# Patient Record
Sex: FEMALE | Race: WHITE | NOT HISPANIC OR LATINO
[De-identification: names, ages, dates, MRNs, and addresses within clinical notes are randomized per-mention and may not be internally consistent; named-entity substitution may affect disease eponyms.]

---

## 2017-09-20 ENCOUNTER — APPOINTMENT (OUTPATIENT)
Dept: INTERNAL MEDICINE | Facility: CLINIC | Age: 42
End: 2017-09-20
Payer: COMMERCIAL

## 2017-09-20 VITALS
SYSTOLIC BLOOD PRESSURE: 130 MMHG | HEIGHT: 63 IN | RESPIRATION RATE: 16 BRPM | TEMPERATURE: 98.6 F | WEIGHT: 210 LBS | BODY MASS INDEX: 37.21 KG/M2 | DIASTOLIC BLOOD PRESSURE: 100 MMHG | HEART RATE: 76 BPM

## 2017-09-20 DIAGNOSIS — K59.00 CONSTIPATION, UNSPECIFIED: ICD-10-CM

## 2017-09-20 DIAGNOSIS — Z82.49 FAMILY HISTORY OF ISCHEMIC HEART DISEASE AND OTHER DISEASES OF THE CIRCULATORY SYSTEM: ICD-10-CM

## 2017-09-20 DIAGNOSIS — K64.9 UNSPECIFIED HEMORRHOIDS: ICD-10-CM

## 2017-09-20 DIAGNOSIS — Z00.00 ENCOUNTER FOR GENERAL ADULT MEDICAL EXAMINATION W/OUT ABNORMAL FINDINGS: ICD-10-CM

## 2017-09-20 DIAGNOSIS — Z80.1 FAMILY HISTORY OF MALIGNANT NEOPLASM OF TRACHEA, BRONCHUS AND LUNG: ICD-10-CM

## 2017-09-20 DIAGNOSIS — M79.1 MYALGIA: ICD-10-CM

## 2017-09-20 DIAGNOSIS — K21.9 GASTRO-ESOPHAGEAL REFLUX DISEASE W/OUT ESOPHAGITIS: ICD-10-CM

## 2017-09-20 PROCEDURE — 99204 OFFICE O/P NEW MOD 45 MIN: CPT

## 2017-10-03 ENCOUNTER — APPOINTMENT (OUTPATIENT)
Dept: INTERNAL MEDICINE | Facility: CLINIC | Age: 42
End: 2017-10-03
Payer: COMMERCIAL

## 2017-10-03 VITALS
HEART RATE: 84 BPM | RESPIRATION RATE: 16 BRPM | DIASTOLIC BLOOD PRESSURE: 84 MMHG | SYSTOLIC BLOOD PRESSURE: 124 MMHG | TEMPERATURE: 98.4 F

## 2017-10-03 PROCEDURE — 99213 OFFICE O/P EST LOW 20 MIN: CPT | Mod: 25

## 2017-10-03 PROCEDURE — 36415 COLL VENOUS BLD VENIPUNCTURE: CPT

## 2017-10-04 LAB
CHOLEST SERPL-MCNC: 243 MG/DL
CHOLEST/HDLC SERPL: 3.8 RATIO
HDLC SERPL-MCNC: 64 MG/DL
LDLC SERPL CALC-MCNC: 139 MG/DL
TRIGL SERPL-MCNC: 202 MG/DL

## 2017-12-29 ENCOUNTER — RX RENEWAL (OUTPATIENT)
Age: 42
End: 2017-12-29

## 2018-04-06 ENCOUNTER — RX RENEWAL (OUTPATIENT)
Age: 43
End: 2018-04-06

## 2018-04-06 ENCOUNTER — MEDICATION RENEWAL (OUTPATIENT)
Age: 43
End: 2018-04-06

## 2018-06-06 ENCOUNTER — APPOINTMENT (OUTPATIENT)
Dept: INTERNAL MEDICINE | Facility: CLINIC | Age: 43
End: 2018-06-06
Payer: COMMERCIAL

## 2018-06-06 VITALS
TEMPERATURE: 98.4 F | HEART RATE: 76 BPM | HEIGHT: 63 IN | RESPIRATION RATE: 16 BRPM | DIASTOLIC BLOOD PRESSURE: 88 MMHG | OXYGEN SATURATION: 99 % | BODY MASS INDEX: 37.21 KG/M2 | WEIGHT: 210 LBS | SYSTOLIC BLOOD PRESSURE: 140 MMHG

## 2018-06-06 DIAGNOSIS — I10 ESSENTIAL (PRIMARY) HYPERTENSION: ICD-10-CM

## 2018-06-06 DIAGNOSIS — E03.9 HYPOTHYROIDISM, UNSPECIFIED: ICD-10-CM

## 2018-06-06 DIAGNOSIS — L70.8 OTHER ACNE: ICD-10-CM

## 2018-06-06 DIAGNOSIS — Z78.9 OTHER SPECIFIED HEALTH STATUS: ICD-10-CM

## 2018-06-06 DIAGNOSIS — E78.5 HYPERLIPIDEMIA, UNSPECIFIED: ICD-10-CM

## 2018-06-06 PROCEDURE — 99215 OFFICE O/P EST HI 40 MIN: CPT | Mod: 25

## 2018-06-06 PROCEDURE — 36415 COLL VENOUS BLD VENIPUNCTURE: CPT

## 2018-06-06 NOTE — HISTORY OF PRESENT ILLNESS
[FreeTextEntry1] : F/U HTN, Obesity, Hyperlipidemia, Hypothyroidism  [de-identified] : 42 yr old presents above conditions. Feels well overall. Upset about her obesity and recent weight gain. Reports her schedule will be lessening over summer months and plans on Life style changes happening. Seems motivated to change at this time. Reports BP taken at home and at work ranges Systolic 125- 135, Diastolic 75- 82. 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

## 2018-06-06 NOTE — COUNSELING
[Weight management counseling provided] : Weight management [Healthy eating counseling provided] : healthy eating [Activity counseling provided] : activity [Behavioral health counseling provided] : behavioral health  [Target Wt Loss Goal ___] : Target weight loss goal [unfilled] lbs [Weight Self Once Weekly] : Weight self once weekly [Decrease Portions] : Decrease food portions [Low Salt Diet] : Low salt diet [Walking] : Walking [Sleep ___ hours/day] : Sleep [unfilled] hours/day [Engage in a relaxing activity] : Engage in a relaxing activity [Plan in advance] : Plan in advance [None] : None [Good understanding] : Patient has a good understanding of lifestyle changes and the steps needed to achieve self management goals [de-identified] : Discussed in detail weight loss programs; Pt given number to Nutritionist/

## 2018-06-06 NOTE — REVIEW OF SYSTEMS
[Depression] : depression [Negative] : Heme/Lymph [FreeTextEntry2] : O [de-identified] : mild depression, + verge of tears discussing weight problem

## 2018-06-06 NOTE — ASSESSMENT
[FreeTextEntry1] : 42 yr old presents F/U \par #1 HTN- needs slightly better control, Whit coat Syndrome?? Weight loss, low salt, exercise, continue monitoring BP/ Logging\par #2 Hyperlipidemia- Weight loss, medication, exercise\par #3 Hypothyroidism- Check TFT's today\par #4 BMI 37%- Encouraged weight loss program , Life style changes, stress reduction\par #4 Acne- Clean and dry Topical medication , Avoid direct Sun contact\par Labs today, Form for Job done F/U 3 months BP Log/ Weight Re-check

## 2018-06-06 NOTE — PHYSICAL EXAM
[No Acute Distress] : no acute distress [Well Nourished] : well nourished [Well Developed] : well developed [Well-Appearing] : well-appearing [Normal Sclera/Conjunctiva] : normal sclera/conjunctiva [PERRL] : pupils equal round and reactive to light [EOMI] : extraocular movements intact [Normal Outer Ear/Nose] : the outer ears and nose were normal in appearance [Normal Oropharynx] : the oropharynx was normal [No JVD] : no jugular venous distention [Supple] : supple [No Lymphadenopathy] : no lymphadenopathy [Thyroid Normal, No Nodules] : the thyroid was normal and there were no nodules present [No Respiratory Distress] : no respiratory distress  [Clear to Auscultation] : lungs were clear to auscultation bilaterally [No Accessory Muscle Use] : no accessory muscle use [Normal Rate] : normal rate  [Regular Rhythm] : with a regular rhythm [Normal S1, S2] : normal S1 and S2 [No Murmur] : no murmur heard [No Edema] : there was no peripheral edema [Normal Posterior Cervical Nodes] : no posterior cervical lymphadenopathy [Normal Anterior Cervical Nodes] : no anterior cervical lymphadenopathy [No CVA Tenderness] : no CVA  tenderness [No Spinal Tenderness] : no spinal tenderness [No Joint Swelling] : no joint swelling [Grossly Normal Strength/Tone] : grossly normal strength/tone [No Rash] : no rash [Normal Gait] : normal gait [Coordination Grossly Intact] : coordination grossly intact [No Focal Deficits] : no focal deficits [Deep Tendon Reflexes (DTR)] : deep tendon reflexes were 2+ and symmetric [Speech Grossly Normal] : speech grossly normal [Normal Affect] : the affect was normal [Normal Insight/Judgement] : insight and judgment were intact [de-identified] : +Obese [de-identified] : mildly depressed

## 2018-06-06 NOTE — HEALTH RISK ASSESSMENT
[No falls in past year] : Patient reported no falls in the past year [0] : 2) Feeling down, depressed, or hopeless: Not at all (0) [] : No [de-identified] : none [de-identified] : GYN/ Colposcopy- normal  [PDM2Tfidl] : 0

## 2018-06-07 LAB
BASOPHILS # BLD AUTO: 0.02 K/UL
BASOPHILS NFR BLD AUTO: 0.4 %
EOSINOPHIL # BLD AUTO: 0.14 K/UL
EOSINOPHIL NFR BLD AUTO: 2.7 %
HCT VFR BLD CALC: 46.3 %
HGB BLD-MCNC: 14.6 G/DL
IMM GRANULOCYTES NFR BLD AUTO: 0.2 %
LYMPHOCYTES # BLD AUTO: 1.51 K/UL
LYMPHOCYTES NFR BLD AUTO: 29.3 %
MAN DIFF?: NORMAL
MCHC RBC-ENTMCNC: 31.5 GM/DL
MCHC RBC-ENTMCNC: 31.6 PG
MCV RBC AUTO: 100.2 FL
MONOCYTES # BLD AUTO: 0.35 K/UL
MONOCYTES NFR BLD AUTO: 6.8 %
NEUTROPHILS # BLD AUTO: 3.13 K/UL
NEUTROPHILS NFR BLD AUTO: 60.6 %
PLATELET # BLD AUTO: 307 K/UL
RBC # BLD: 4.62 M/UL
RBC # FLD: 13.7 %
WBC # FLD AUTO: 5.16 K/UL

## 2018-06-11 ENCOUNTER — TRANSCRIPTION ENCOUNTER (OUTPATIENT)
Age: 43
End: 2018-06-11

## 2018-06-11 LAB
ALBUMIN SERPL ELPH-MCNC: 4.7 G/DL
ALP BLD-CCNC: 59 U/L
ALT SERPL-CCNC: 29 U/L
ANION GAP SERPL CALC-SCNC: 12 MMOL/L
AST SERPL-CCNC: 25 U/L
BILIRUB SERPL-MCNC: 0.8 MG/DL
BUN SERPL-MCNC: 18 MG/DL
CALCIUM SERPL-MCNC: 9.6 MG/DL
CHLORIDE SERPL-SCNC: 104 MMOL/L
CHOLEST SERPL-MCNC: 228 MG/DL
CHOLEST/HDLC SERPL: 3.2 RATIO
CO2 SERPL-SCNC: 28 MMOL/L
CREAT SERPL-MCNC: 0.86 MG/DL
GLUCOSE SERPL-MCNC: 85 MG/DL
HBA1C MFR BLD HPLC: 5 %
HDLC SERPL-MCNC: 72 MG/DL
LDLC SERPL CALC-MCNC: 135 MG/DL
POTASSIUM SERPL-SCNC: 4.7 MMOL/L
PROT SERPL-MCNC: 7.4 G/DL
SODIUM SERPL-SCNC: 144 MMOL/L
TRIGL SERPL-MCNC: 107 MG/DL
TSH SERPL-ACNC: 3.36 UIU/ML

## 2018-06-20 ENCOUNTER — RX RENEWAL (OUTPATIENT)
Age: 43
End: 2018-06-20

## 2018-09-14 ENCOUNTER — RX RENEWAL (OUTPATIENT)
Age: 43
End: 2018-09-14

## 2018-09-14 RX ORDER — LEVOTHYROXINE SODIUM 0.05 MG/1
50 TABLET ORAL
Qty: 90 | Refills: 1 | Status: ACTIVE | COMMUNITY
Start: 2017-09-20 | End: 1900-01-01

## 2019-02-27 ENCOUNTER — OUTPATIENT (OUTPATIENT)
Dept: OUTPATIENT SERVICES | Facility: HOSPITAL | Age: 44
LOS: 1 days | End: 2019-02-27
Payer: COMMERCIAL

## 2019-02-27 ENCOUNTER — APPOINTMENT (OUTPATIENT)
Dept: MAMMOGRAPHY | Facility: CLINIC | Age: 44
End: 2019-02-27
Payer: COMMERCIAL

## 2019-02-27 DIAGNOSIS — Z12.31 ENCOUNTER FOR SCREENING MAMMOGRAM FOR MALIGNANT NEOPLASM OF BREAST: ICD-10-CM

## 2019-02-27 PROCEDURE — 77063 BREAST TOMOSYNTHESIS BI: CPT | Mod: 26

## 2019-02-27 PROCEDURE — 77067 SCR MAMMO BI INCL CAD: CPT | Mod: 26

## 2019-02-27 PROCEDURE — 77067 SCR MAMMO BI INCL CAD: CPT

## 2019-02-27 PROCEDURE — 77063 BREAST TOMOSYNTHESIS BI: CPT

## 2020-04-25 ENCOUNTER — MESSAGE (OUTPATIENT)
Age: 45
End: 2020-04-25

## 2020-06-11 ENCOUNTER — APPOINTMENT (OUTPATIENT)
Dept: MAMMOGRAPHY | Facility: CLINIC | Age: 45
End: 2020-06-11

## 2020-08-17 ENCOUNTER — APPOINTMENT (OUTPATIENT)
Dept: ULTRASOUND IMAGING | Facility: CLINIC | Age: 45
End: 2020-08-17
Payer: COMMERCIAL

## 2020-08-17 ENCOUNTER — APPOINTMENT (OUTPATIENT)
Dept: MAMMOGRAPHY | Facility: CLINIC | Age: 45
End: 2020-08-17
Payer: COMMERCIAL

## 2020-08-17 ENCOUNTER — OUTPATIENT (OUTPATIENT)
Dept: OUTPATIENT SERVICES | Facility: HOSPITAL | Age: 45
LOS: 1 days | End: 2020-08-17
Payer: COMMERCIAL

## 2020-08-17 DIAGNOSIS — Z12.31 ENCOUNTER FOR SCREENING MAMMOGRAM FOR MALIGNANT NEOPLASM OF BREAST: ICD-10-CM

## 2020-08-17 DIAGNOSIS — Z00.8 ENCOUNTER FOR OTHER GENERAL EXAMINATION: ICD-10-CM

## 2020-08-17 PROCEDURE — 77067 SCR MAMMO BI INCL CAD: CPT | Mod: 26

## 2020-08-17 PROCEDURE — 77063 BREAST TOMOSYNTHESIS BI: CPT | Mod: 26

## 2020-08-17 PROCEDURE — 76641 ULTRASOUND BREAST COMPLETE: CPT

## 2020-08-17 PROCEDURE — 77063 BREAST TOMOSYNTHESIS BI: CPT

## 2020-08-17 PROCEDURE — 76641 ULTRASOUND BREAST COMPLETE: CPT | Mod: 26,50

## 2020-08-17 PROCEDURE — 77067 SCR MAMMO BI INCL CAD: CPT

## 2021-06-07 ENCOUNTER — APPOINTMENT (OUTPATIENT)
Dept: UROLOGY | Facility: CLINIC | Age: 46
End: 2021-06-07
Payer: COMMERCIAL

## 2021-06-07 ENCOUNTER — TRANSCRIPTION ENCOUNTER (OUTPATIENT)
Age: 46
End: 2021-06-07

## 2021-06-07 VITALS
DIASTOLIC BLOOD PRESSURE: 87 MMHG | TEMPERATURE: 97.7 F | WEIGHT: 210 LBS | RESPIRATION RATE: 16 BRPM | HEIGHT: 63 IN | BODY MASS INDEX: 37.21 KG/M2 | SYSTOLIC BLOOD PRESSURE: 142 MMHG | HEART RATE: 63 BPM

## 2021-06-07 DIAGNOSIS — Z87.59 PERSONAL HISTORY OF OTHER COMPLICATIONS OF PREGNANCY, CHILDBIRTH AND THE PUERPERIUM: ICD-10-CM

## 2021-06-07 DIAGNOSIS — N39.3 STRESS INCONTINENCE (FEMALE) (MALE): ICD-10-CM

## 2021-06-07 DIAGNOSIS — Z86.39 PERSONAL HISTORY OF OTHER ENDOCRINE, NUTRITIONAL AND METABOLIC DISEASE: ICD-10-CM

## 2021-06-07 DIAGNOSIS — Z86.79 PERSONAL HISTORY OF OTHER DISEASES OF THE CIRCULATORY SYSTEM: ICD-10-CM

## 2021-06-07 PROCEDURE — 99204 OFFICE O/P NEW MOD 45 MIN: CPT

## 2021-06-07 RX ORDER — LEVOTHYROXINE SODIUM 137 UG/1
TABLET ORAL
Refills: 0 | Status: DISCONTINUED | COMMUNITY
End: 2021-06-07

## 2021-06-07 RX ORDER — SIMVASTATIN 5 MG/1
5 TABLET, FILM COATED ORAL
Qty: 90 | Refills: 0 | Status: DISCONTINUED | COMMUNITY
Start: 2017-09-20 | End: 2021-06-07

## 2021-06-07 RX ORDER — SIMVASTATIN 5 MG/1
5 TABLET, FILM COATED ORAL DAILY
Refills: 0 | Status: DISCONTINUED | COMMUNITY
End: 2021-06-07

## 2021-06-07 RX ORDER — SIMVASTATIN 10 MG/1
10 TABLET, FILM COATED ORAL
Refills: 0 | Status: ACTIVE | COMMUNITY

## 2021-06-07 RX ORDER — RAMIPRIL 2.5 MG/1
2.5 CAPSULE ORAL DAILY
Refills: 0 | Status: ACTIVE | COMMUNITY

## 2021-06-07 RX ORDER — CLINDAMYCIN AND BENZOYL PEROXIDE 50; 10 MG/G; MG/G
1-5 GEL TOPICAL DAILY
Qty: 1 | Refills: 3 | Status: DISCONTINUED | COMMUNITY
Start: 2018-06-06 | End: 2021-06-07

## 2021-06-07 RX ORDER — LEVOTHYROXINE SODIUM 0.05 MG/1
50 TABLET ORAL DAILY
Qty: 90 | Refills: 0 | Status: DISCONTINUED | COMMUNITY
End: 2021-06-07

## 2021-06-07 RX ORDER — RAMIPRIL 2.5 MG/1
2.5 CAPSULE ORAL DAILY
Qty: 90 | Refills: 1 | Status: DISCONTINUED | COMMUNITY
Start: 2017-09-20 | End: 2021-06-07

## 2021-06-07 NOTE — HISTORY OF PRESENT ILLNESS
[FreeTextEntry1] : Hoda Duncan presents to the office for initial visit for microscopic hematuria. She is a 45 year old female with history of hypertension, hyperlipidemia, and hypothyroidism who was told she has microscopic hematuria on urinalysis at her annual physical 1 year ago with Dr. Richard Campos. She denies gross hematuria, history of stones. States she smoked socially in college, but no tobacco or occupational exposures since. 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 She states she was told she had blood in her urine many year ago but attributed it to her menstrual cycle. She currently uses a Mirena IUD and rarely gets a period. Denies family history of  cancers. \par \par Of note, she states she experienced a UTI last month with urgency, with incontinence, frequency, and burning. No urine culture performed, treated with Bactrim DS which cleared it up. She states she believes it was related to sex and not urinating. Has not had a UTI in a very long time. \par \par The patient also complains of stress urinary incontinence with sneezing, coughing, exercising. Has to brace herself with sneeze to prevent large leakage. Worsening in last 3-4 years. Two vaginal deliveries. First delivery traumatic with bad episiotomy 17 years ago. Has not tried anything to improve the incontinence. States she needs to lose weight and perform Kegels. Discussed physical therapy. \par \par Allergies: none \par \par

## 2021-06-07 NOTE — PHYSICAL EXAM
[General Appearance - Well Developed] : well developed [General Appearance - Well Nourished] : well nourished [General Appearance - In No Acute Distress] : no acute distress [Edema] : no peripheral edema [Exaggerated Use Of Accessory Muscles For Inspiration] : no accessory muscle use [Abdomen Soft] : soft [Abdomen Tenderness] : non-tender [Costovertebral Angle Tenderness] : no ~M costovertebral angle tenderness [Normal Station and Gait] : the gait and station were normal for the patient's age [Skin Color & Pigmentation] : normal skin color and pigmentation [No Focal Deficits] : no focal deficits [Oriented To Time, Place, And Person] : oriented to person, place, and time [FreeTextEntry1] : deferred until time of cysto

## 2021-06-07 NOTE — ASSESSMENT
[FreeTextEntry1] : Microscopic hematuria. Needs eval\par --UA, UCx\par --Renal US\par --Cysto\par \par GLO. Stress urinary incontinence. Discussed non-surgical and surgical management. Non-surgical management involves strengthening of the pelvic floor with kegel exercises +/- pelvic floor PT. Surgical management options include urethral bulking (not good for high volume leakage and not permanent) and mid urethral sling. For this patient, leakage is low volume and pt is not interested in surgical management. \par --Exam at time of cysto\par --consider PT

## 2021-06-07 NOTE — REVIEW OF SYSTEMS
[Date of last menstrual period ____] : date of last menstrual period: [unfilled] [Told you have blood in urine on a urine test] : told blood was present in a urine test [Wake up at night to urinate  How many times?  ___] : wakes up to urinate [unfilled] times during the night [Leakage of urine with straining, coughing, laughing] : leakage of urine with straining, coughing, laughing [see HPI] : see HPI [Negative] : Psychiatric

## 2021-06-08 ENCOUNTER — TRANSCRIPTION ENCOUNTER (OUTPATIENT)
Age: 46
End: 2021-06-08

## 2021-06-08 LAB
APPEARANCE: CLEAR
BACTERIA: NEGATIVE
BILIRUBIN URINE: NEGATIVE
BLOOD URINE: ABNORMAL
COLOR: NORMAL
GLUCOSE QUALITATIVE U: NEGATIVE
HYALINE CASTS: 0 /LPF
KETONES URINE: NEGATIVE
LEUKOCYTE ESTERASE URINE: NEGATIVE
MICROSCOPIC-UA: NORMAL
NITRITE URINE: NEGATIVE
PH URINE: 6
PROTEIN URINE: NEGATIVE
RED BLOOD CELLS URINE: 4 /HPF
SPECIFIC GRAVITY URINE: 1.01
SQUAMOUS EPITHELIAL CELLS: 1 /HPF
UROBILINOGEN URINE: NORMAL
WHITE BLOOD CELLS URINE: 1 /HPF

## 2021-06-10 LAB — BACTERIA UR CULT: NORMAL

## 2021-08-01 ENCOUNTER — NON-APPOINTMENT (OUTPATIENT)
Age: 46
End: 2021-08-01

## 2021-08-02 ENCOUNTER — APPOINTMENT (OUTPATIENT)
Dept: UROLOGY | Facility: CLINIC | Age: 46
End: 2021-08-02
Payer: COMMERCIAL

## 2021-08-02 ENCOUNTER — OUTPATIENT (OUTPATIENT)
Dept: OUTPATIENT SERVICES | Facility: HOSPITAL | Age: 46
LOS: 1 days | End: 2021-08-02
Payer: COMMERCIAL

## 2021-08-02 VITALS
HEART RATE: 61 BPM | SYSTOLIC BLOOD PRESSURE: 164 MMHG | DIASTOLIC BLOOD PRESSURE: 95 MMHG | TEMPERATURE: 98.5 F | RESPIRATION RATE: 16 BRPM

## 2021-08-02 DIAGNOSIS — R31.29 OTHER MICROSCOPIC HEMATURIA: ICD-10-CM

## 2021-08-02 DIAGNOSIS — R35.0 FREQUENCY OF MICTURITION: ICD-10-CM

## 2021-08-02 PROCEDURE — 52000 CYSTOURETHROSCOPY: CPT

## 2021-08-02 PROCEDURE — ZZZZZ: CPT

## 2021-08-05 ENCOUNTER — APPOINTMENT (OUTPATIENT)
Dept: ULTRASOUND IMAGING | Facility: CLINIC | Age: 46
End: 2021-08-05

## 2021-08-18 ENCOUNTER — APPOINTMENT (OUTPATIENT)
Dept: ULTRASOUND IMAGING | Facility: CLINIC | Age: 46
End: 2021-08-18
Payer: COMMERCIAL

## 2021-08-18 ENCOUNTER — OUTPATIENT (OUTPATIENT)
Dept: OUTPATIENT SERVICES | Facility: HOSPITAL | Age: 46
LOS: 1 days | End: 2021-08-18
Payer: COMMERCIAL

## 2021-08-18 ENCOUNTER — APPOINTMENT (OUTPATIENT)
Dept: MAMMOGRAPHY | Facility: CLINIC | Age: 46
End: 2021-08-18
Payer: COMMERCIAL

## 2021-08-18 DIAGNOSIS — Z00.8 ENCOUNTER FOR OTHER GENERAL EXAMINATION: ICD-10-CM

## 2021-08-18 PROCEDURE — 77067 SCR MAMMO BI INCL CAD: CPT | Mod: 26

## 2021-08-18 PROCEDURE — 77063 BREAST TOMOSYNTHESIS BI: CPT | Mod: 26

## 2021-08-18 PROCEDURE — 77067 SCR MAMMO BI INCL CAD: CPT

## 2021-08-18 PROCEDURE — 76641 ULTRASOUND BREAST COMPLETE: CPT | Mod: 26,50

## 2021-08-18 PROCEDURE — 77063 BREAST TOMOSYNTHESIS BI: CPT

## 2021-08-18 PROCEDURE — 76641 ULTRASOUND BREAST COMPLETE: CPT

## 2024-09-10 ENCOUNTER — APPOINTMENT (OUTPATIENT)
Dept: SURGERY | Facility: CLINIC | Age: 49
End: 2024-09-10
Payer: COMMERCIAL

## 2024-09-10 VITALS
RESPIRATION RATE: 16 BRPM | BODY MASS INDEX: 37.12 KG/M2 | WEIGHT: 209.5 LBS | HEIGHT: 63 IN | SYSTOLIC BLOOD PRESSURE: 129 MMHG | OXYGEN SATURATION: 99 % | DIASTOLIC BLOOD PRESSURE: 87 MMHG | HEART RATE: 59 BPM | TEMPERATURE: 96.9 F

## 2024-09-10 DIAGNOSIS — E66.9 OBESITY, UNSPECIFIED: ICD-10-CM

## 2024-09-10 DIAGNOSIS — Z87.19 PERSONAL HISTORY OF OTHER DISEASES OF THE DIGESTIVE SYSTEM: ICD-10-CM

## 2024-09-10 DIAGNOSIS — M79.673 PAIN IN UNSPECIFIED FOOT: ICD-10-CM

## 2024-09-10 DIAGNOSIS — Z82.0 FAMILY HISTORY OF EPILEPSY AND OTHER DISEASES OF THE NERVOUS SYSTEM: ICD-10-CM

## 2024-09-10 DIAGNOSIS — M25.569 PAIN IN UNSPECIFIED KNEE: ICD-10-CM

## 2024-09-10 DIAGNOSIS — Z82.49 FAMILY HISTORY OF ISCHEMIC HEART DISEASE AND OTHER DISEASES OF THE CIRCULATORY SYSTEM: ICD-10-CM

## 2024-09-10 PROCEDURE — 99204 OFFICE O/P NEW MOD 45 MIN: CPT

## 2024-09-10 RX ORDER — LEVOTHYROXINE SODIUM 50 UG/1
50 TABLET ORAL
Refills: 0 | Status: ACTIVE | COMMUNITY

## 2024-09-10 RX ORDER — TIRZEPATIDE 5 MG/.5ML
5 INJECTION, SOLUTION SUBCUTANEOUS
Refills: 0 | Status: ACTIVE | COMMUNITY

## 2024-09-10 RX ORDER — LOSARTAN POTASSIUM AND HYDROCHLOROTHIAZIDE 50; 12.5 MG/1; MG/1
50-12.5 TABLET, FILM COATED ORAL
Refills: 0 | Status: ACTIVE | COMMUNITY

## 2024-09-10 RX ORDER — ROSUVASTATIN CALCIUM 5 MG/1
5 TABLET, FILM COATED ORAL
Refills: 0 | Status: ACTIVE | COMMUNITY

## 2024-09-13 NOTE — ASSESSMENT
[FreeTextEntry1] : 48-year-old female with a current BMI of 37.11 has been using compounding Zepbound 5 mg prescribed by outside provider, reports doing well, no significant side effects except constipation for which she manages it with over-the-counter fiber supplement.  Patient wants to use Zepbound produced by pharmaceutical company lately.  Instead of compounding Zepbound.

## 2024-09-13 NOTE — PHYSICAL EXAM
[Normal] : affect appropriate [de-identified] : Well-appearing 48-year-old female with obesity [de-identified] : Supple [de-identified] : Nonlabored respiration on room air [de-identified] : Soft, nondistended, nontender

## 2024-09-13 NOTE — PHYSICAL EXAM
[Normal] : affect appropriate [de-identified] : Well-appearing 48-year-old female with obesity [de-identified] : Supple [de-identified] : Nonlabored respiration on room air [de-identified] : Soft, nondistended, nontender

## 2024-09-13 NOTE — PLAN
[FreeTextEntry1] : I had a lengthy discussion with the patient regarding nutrition, exercise, weight loss medications.   Patient qualifies for and is currently most interested in weight loss medications.   I emphasized the importance of making healthier food choices including fresh fruits and vegetables, lean meats, and protein sources. I recommended front loading calories, incorporating whole grains, and eliminating fast foods. I also discussed the importance of avoiding fried/fatty foods and foods containing high sugar content including juices/shakes/sodas/desserts.   I also encouraged beginning an exercise program and recommended cardiovascular exercises along with strength training to build lean muscle. I made suggestions on different types of exercises to try.   Patient will work on the following: -Meet with nutritionist -Eliminate snacks -Focus on eating 3 well-balanced meals during the daytime with appropriate portion size -Cooking fresh meals rather than take out/processed/ready-made foods -Incorporating exercise; walk 8-10k steps daily      I have discussed initiating Zepbound therapy for pt. All risks and benefits have been discussed with the patient.   Currently there are no contraindications for the use of of Zepbound after reviewing pts medical history and labs including personal or family history of thyroid cancer or MEN2. Possible side effects were discussed with the patient including nausea, reflux, constipation, delayed gastric emptying, or pancreatitis.    Discussed with the patient of the warnings of pregnancy while on Zepbound - instructed pt to avoid planned pregnancy and use of contraception - advised pt to stop immediately if becomes pregnant Pt verbalized understanding of the above   Pt verbalizes understanding and wishes to proceed with medical therapy. Start Zepbound 7.5 based on authorization and tolerance. Patient educated to call with questions/concerns. All questions answered.   Patient is advised to call after third dose for possible dose escalation and make 2 month follow up appointment.  Patient verbalized understanding.  Aide Cancino MD [facility] [address] [facility] [address] [place] [address] tel. [phone] fax [phone].

## 2024-09-13 NOTE — PLAN
[FreeTextEntry1] : I had a lengthy discussion with the patient regarding nutrition, exercise, weight loss medications.   Patient qualifies for and is currently most interested in weight loss medications.   I emphasized the importance of making healthier food choices including fresh fruits and vegetables, lean meats, and protein sources. I recommended front loading calories, incorporating whole grains, and eliminating fast foods. I also discussed the importance of avoiding fried/fatty foods and foods containing high sugar content including juices/shakes/sodas/desserts.   I also encouraged beginning an exercise program and recommended cardiovascular exercises along with strength training to build lean muscle. I made suggestions on different types of exercises to try.   Patient will work on the following: -Meet with nutritionist -Eliminate snacks -Focus on eating 3 well-balanced meals during the daytime with appropriate portion size -Cooking fresh meals rather than take out/processed/ready-made foods -Incorporating exercise; walk 8-10k steps daily      I have discussed initiating Zepbound therapy for pt. All risks and benefits have been discussed with the patient.   Currently there are no contraindications for the use of of Zepbound after reviewing pts medical history and labs including personal or family history of thyroid cancer or MEN2. Possible side effects were discussed with the patient including nausea, reflux, constipation, delayed gastric emptying, or pancreatitis.    Discussed with the patient of the warnings of pregnancy while on Zepbound - instructed pt to avoid planned pregnancy and use of contraception - advised pt to stop immediately if becomes pregnant Pt verbalized understanding of the above   Pt verbalizes understanding and wishes to proceed with medical therapy. Start Zepbound 7.5 based on authorization and tolerance. Patient educated to call with questions/concerns. All questions answered.   Patient is advised to call after third dose for possible dose escalation and make 2 month follow up appointment.  Patient verbalized understanding.  Aide Cancino MD [facility] [address] [facility] [address] [place] [address] tel. [phone] fax [phone].

## 2024-09-13 NOTE — HISTORY OF PRESENT ILLNESS
[de-identified] : Hoda is a 48-year-old female here for initial weight loss consultation.  Patient states she consulted with a Non[facility] NP who prescribed Zepbound, and patient gets Zepbound from a compounding pharmacy, and she is paying out-of-pocket.   Patient reports compounding zepbound has helped her with losing weight, denies any significant side effects except constipation, and she manages constipation with OTC fiber supplement.  Patient reports she is here for weight loss consultation because she is aware compounding GLP-1 agonist medications are not regulated by FDA, and she would like to consult with a specialist in weight loss management, and have a prior authorization submitted to her insurance to see if Zepbound made by pharmaceutical company [employer] is covered.    Patient brought copy of lab work for review.

## 2024-09-13 NOTE — HISTORY OF PRESENT ILLNESS
[de-identified] : Hoda is a 48-year-old female here for initial weight loss consultation.  Patient states she consulted with a Non-[facility] NP who prescribed Zepbound, and patient gets Zepbound from a compounding pharmacy, and she is paying out-of-pocket.   Patient reports compounding zepbound has helped her with losing weight, denies any significant side effects except constipation, and she manages constipation with OTC fiber supplement.  Patient reports she is here for weight loss consultation because she is aware compounding GLP-1 agonist medications are not regulated by FDA, and she would like to consult with a specialist in weight loss management, and have a prior authorization submitted to her insurance to see if Zepbound made by pharmaceutical company [employer] is covered.    Patient brought copy of lab work for review.

## 2024-09-13 NOTE — PLAN
[FreeTextEntry1] : I had a lengthy discussion with the patient regarding nutrition, exercise, weight loss medications.   Patient qualifies for and is currently most interested in weight loss medications.   I emphasized the importance of making healthier food choices including fresh fruits and vegetables, lean meats, and protein sources. I recommended front loading calories, incorporating whole grains, and eliminating fast foods. I also discussed the importance of avoiding fried/fatty foods and foods containing high sugar content including juices/shakes/sodas/desserts.   I also encouraged beginning an exercise program and recommended cardiovascular exercises along with strength training to build lean muscle. I made suggestions on different types of exercises to try.   Patient will work on the following: -Meet with nutritionist -Eliminate snacks -Focus on eating 3 well-balanced meals during the daytime with appropriate portion size -Cooking fresh meals rather than take out/processed/ready-made foods -Incorporating exercise; walk 8-10k steps daily      I have discussed initiating Zepbound therapy for pt. All risks and benefits have been discussed with the patient.   Currently there are no contraindications for the use of of Zepbound after reviewing pts medical history and labs including personal or family history of thyroid cancer or MEN2. Possible side effects were discussed with the patient including nausea, reflux, constipation, delayed gastric emptying, or pancreatitis.    Discussed with the patient of the warnings of pregnancy while on Zepbound - instructed pt to avoid planned pregnancy and use of contraception - advised pt to stop immediately if becomes pregnant Pt verbalized understanding of the above   Pt verbalizes understanding and wishes to proceed with medical therapy. Start Zepbound 7.5 based on authorization and tolerance. Patient educated to call with questions/concerns. All questions answered.   Patient is advised to call after third dose for possible dose escalation and make 2 month follow up appointment.  Patient verbalized understanding.  Aide Cancino MD [facility] [address] [facility] [address] [place] [address] tel. [phone] fax [phone].

## 2024-09-13 NOTE — HISTORY OF PRESENT ILLNESS
[de-identified] : Hoda is a 48-year-old female here for initial weight loss consultation.  Patient states she consulted with a Non-[facility] NP who prescribed Zepbound, and patient gets Zepbound from a compounding pharmacy, and she is paying out-of-pocket.   Patient reports compounding zepbound has helped her with losing weight, denies any significant side effects except constipation, and she manages constipation with OTC fiber supplement.  Patient reports she is here for weight loss consultation because she is aware compounding GLP-1 agonist medications are not regulated by FDA, and she would like to consult with a specialist in weight loss management, and have a prior authorization submitted to her insurance to see if Zepbound made by pharmaceutical company [employer] is covered.    Patient brought copy of lab work for review.

## 2024-09-13 NOTE — PHYSICAL EXAM
[Normal] : affect appropriate [de-identified] : Well-appearing 48-year-old female with obesity [de-identified] : Supple [de-identified] : Nonlabored respiration on room air [de-identified] : Soft, nondistended, nontender

## 2024-09-17 RX ORDER — TIRZEPATIDE 7.5 MG/.5ML
7.5 INJECTION, SOLUTION SUBCUTANEOUS
Qty: 4 | Refills: 0 | Status: ACTIVE | COMMUNITY
Start: 2024-09-10

## 2024-10-15 VITALS — HEIGHT: 63 IN | BODY MASS INDEX: 35.93 KG/M2 | WEIGHT: 202.8 LBS

## 2024-10-30 VITALS — BODY MASS INDEX: 35.08 KG/M2 | WEIGHT: 198 LBS | HEIGHT: 63 IN

## 2024-11-12 ENCOUNTER — APPOINTMENT (OUTPATIENT)
Dept: SURGERY | Facility: CLINIC | Age: 49
End: 2024-11-12
Payer: COMMERCIAL

## 2024-11-12 VITALS — HEIGHT: 63 IN | WEIGHT: 197 LBS | BODY MASS INDEX: 34.91 KG/M2

## 2024-11-12 DIAGNOSIS — E66.9 OBESITY, UNSPECIFIED: ICD-10-CM

## 2024-11-12 PROCEDURE — 99213 OFFICE O/P EST LOW 20 MIN: CPT

## 2024-12-09 ENCOUNTER — NON-APPOINTMENT (OUTPATIENT)
Age: 49
End: 2024-12-09

## 2024-12-27 ENCOUNTER — NON-APPOINTMENT (OUTPATIENT)
Age: 49
End: 2024-12-27

## 2024-12-27 DIAGNOSIS — E66.9 OBESITY, UNSPECIFIED: ICD-10-CM

## 2025-02-05 ENCOUNTER — APPOINTMENT (OUTPATIENT)
Dept: SURGERY | Facility: CLINIC | Age: 50
End: 2025-02-05
Payer: COMMERCIAL

## 2025-02-05 VITALS — BODY MASS INDEX: 33.13 KG/M2 | HEIGHT: 63 IN | WEIGHT: 187 LBS

## 2025-02-05 DIAGNOSIS — E66.9 OBESITY, UNSPECIFIED: ICD-10-CM

## 2025-02-05 PROCEDURE — 99213 OFFICE O/P EST LOW 20 MIN: CPT | Mod: 95

## 2025-03-28 VITALS — WEIGHT: 181 LBS | BODY MASS INDEX: 32.07 KG/M2 | HEIGHT: 63 IN

## 2025-04-30 ENCOUNTER — APPOINTMENT (OUTPATIENT)
Dept: SURGERY | Facility: CLINIC | Age: 50
End: 2025-04-30
Payer: COMMERCIAL

## 2025-04-30 VITALS — BODY MASS INDEX: 31.18 KG/M2 | WEIGHT: 176 LBS | HEIGHT: 63 IN

## 2025-04-30 DIAGNOSIS — E66.9 OBESITY, UNSPECIFIED: ICD-10-CM

## 2025-04-30 PROCEDURE — 99213 OFFICE O/P EST LOW 20 MIN: CPT | Mod: 95

## 2025-05-12 ENCOUNTER — NON-APPOINTMENT (OUTPATIENT)
Age: 50
End: 2025-05-12

## 2025-05-13 ENCOUNTER — APPOINTMENT (OUTPATIENT)
Dept: ORTHOPEDIC SURGERY | Facility: CLINIC | Age: 50
End: 2025-05-13

## 2025-05-13 VITALS — HEIGHT: 63 IN | BODY MASS INDEX: 31.18 KG/M2 | WEIGHT: 176 LBS

## 2025-05-13 DIAGNOSIS — G56.32 LESION OF RADIAL NERVE, LEFT UPPER LIMB: ICD-10-CM

## 2025-05-13 PROCEDURE — 73080 X-RAY EXAM OF ELBOW: CPT | Mod: RT

## 2025-05-13 PROCEDURE — 99204 OFFICE O/P NEW MOD 45 MIN: CPT

## 2025-05-22 ENCOUNTER — OUTPATIENT (OUTPATIENT)
Dept: OUTPATIENT SERVICES | Facility: HOSPITAL | Age: 50
LOS: 1 days | End: 2025-05-22
Payer: COMMERCIAL

## 2025-05-22 ENCOUNTER — APPOINTMENT (OUTPATIENT)
Dept: ULTRASOUND IMAGING | Facility: CLINIC | Age: 50
End: 2025-05-22

## 2025-05-22 ENCOUNTER — RESULT REVIEW (OUTPATIENT)
Age: 50
End: 2025-05-22

## 2025-05-22 DIAGNOSIS — G56.32 LESION OF RADIAL NERVE, LEFT UPPER LIMB: ICD-10-CM

## 2025-05-22 PROCEDURE — 20606 DRAIN/INJ JOINT/BURSA W/US: CPT

## 2025-05-22 PROCEDURE — 20606 DRAIN/INJ JOINT/BURSA W/US: CPT | Mod: RT

## 2025-06-02 ENCOUNTER — NON-APPOINTMENT (OUTPATIENT)
Age: 50
End: 2025-06-02

## 2025-06-03 VITALS — HEIGHT: 63 IN | WEIGHT: 174.8 LBS | BODY MASS INDEX: 30.97 KG/M2

## 2025-06-26 ENCOUNTER — APPOINTMENT (OUTPATIENT)
Dept: SURGERY | Facility: CLINIC | Age: 50
End: 2025-06-26
Payer: COMMERCIAL

## 2025-06-26 VITALS — BODY MASS INDEX: 31.1 KG/M2 | WEIGHT: 175.5 LBS | HEIGHT: 63 IN

## 2025-06-26 PROCEDURE — 99213 OFFICE O/P EST LOW 20 MIN: CPT | Mod: 95

## 2025-07-14 ENCOUNTER — NON-APPOINTMENT (OUTPATIENT)
Age: 50
End: 2025-07-14

## 2025-07-15 VITALS — WEIGHT: 178 LBS | BODY MASS INDEX: 31.54 KG/M2 | HEIGHT: 63 IN

## 2025-08-12 VITALS — HEIGHT: 63 IN | WEIGHT: 176 LBS | BODY MASS INDEX: 31.18 KG/M2

## 2025-09-03 ENCOUNTER — APPOINTMENT (OUTPATIENT)
Dept: SURGERY | Facility: CLINIC | Age: 50
End: 2025-09-03
Payer: COMMERCIAL

## 2025-09-03 VITALS — WEIGHT: 177 LBS | BODY MASS INDEX: 31.36 KG/M2 | HEIGHT: 63 IN

## 2025-09-03 DIAGNOSIS — E66.9 OBESITY, UNSPECIFIED: ICD-10-CM

## 2025-09-03 PROCEDURE — 99213 OFFICE O/P EST LOW 20 MIN: CPT | Mod: 95
